# Patient Record
Sex: MALE | Race: WHITE | NOT HISPANIC OR LATINO | Employment: FULL TIME | URBAN - METROPOLITAN AREA
[De-identification: names, ages, dates, MRNs, and addresses within clinical notes are randomized per-mention and may not be internally consistent; named-entity substitution may affect disease eponyms.]

---

## 2024-09-26 ENCOUNTER — APPOINTMENT (OUTPATIENT)
Dept: PRIMARY CARE | Facility: CLINIC | Age: 49
End: 2024-09-26

## 2024-09-26 VITALS
DIASTOLIC BLOOD PRESSURE: 80 MMHG | SYSTOLIC BLOOD PRESSURE: 120 MMHG | TEMPERATURE: 97.1 F | WEIGHT: 164.2 LBS | BODY MASS INDEX: 28.03 KG/M2 | HEART RATE: 60 BPM | HEIGHT: 64 IN

## 2024-09-26 DIAGNOSIS — J06.9 UPPER RESPIRATORY TRACT INFECTION, UNSPECIFIED TYPE: ICD-10-CM

## 2024-09-26 DIAGNOSIS — R79.89 LOW TESTOSTERONE: ICD-10-CM

## 2024-09-26 DIAGNOSIS — Z00.00 WELLNESS EXAMINATION: Primary | ICD-10-CM

## 2024-09-26 DIAGNOSIS — R23.9 SKIN CHANGE: ICD-10-CM

## 2024-09-26 DIAGNOSIS — E55.9 VITAMIN D DEFICIENCY: ICD-10-CM

## 2024-09-26 DIAGNOSIS — Z13.6 SCREENING FOR CARDIOVASCULAR CONDITION: ICD-10-CM

## 2024-09-26 DIAGNOSIS — Z12.5 PROSTATE CANCER SCREENING: ICD-10-CM

## 2024-09-26 PROCEDURE — 90656 IIV3 VACC NO PRSV 0.5 ML IM: CPT | Performed by: INTERNAL MEDICINE

## 2024-09-26 PROCEDURE — 90471 IMMUNIZATION ADMIN: CPT | Performed by: INTERNAL MEDICINE

## 2024-09-26 PROCEDURE — 99396 PREV VISIT EST AGE 40-64: CPT | Performed by: INTERNAL MEDICINE

## 2024-09-26 PROCEDURE — 3008F BODY MASS INDEX DOCD: CPT | Performed by: INTERNAL MEDICINE

## 2024-09-26 RX ORDER — AZITHROMYCIN 250 MG/1
TABLET, FILM COATED ORAL
Qty: 6 TABLET | Refills: 0 | Status: SHIPPED | OUTPATIENT
Start: 2024-09-26 | End: 2024-10-01

## 2024-09-26 ASSESSMENT — ENCOUNTER SYMPTOMS
COUGH: 0
FEVER: 0
EYE PAIN: 0
LIGHT-HEADEDNESS: 0
ACTIVITY CHANGE: 0
APPETITE CHANGE: 0
FACIAL ASYMMETRY: 0
HEADACHES: 0
EYE DISCHARGE: 0
GASTROINTESTINAL NEGATIVE: 1
CHOKING: 0
UNEXPECTED WEIGHT CHANGE: 0
CHEST TIGHTNESS: 0
EYE ITCHING: 0
ENDOCRINE NEGATIVE: 1
PSYCHIATRIC NEGATIVE: 1
SHORTNESS OF BREATH: 0
DIAPHORESIS: 0
STRIDOR: 0
HEMATOLOGIC/LYMPHATIC NEGATIVE: 1
NUMBNESS: 0
ALLERGIC/IMMUNOLOGIC NEGATIVE: 1
FATIGUE: 0
WHEEZING: 0
DIZZINESS: 0
PHOTOPHOBIA: 0
PALPITATIONS: 0
APNEA: 0
ARTHRALGIAS: 0
EYE REDNESS: 0
CHILLS: 0

## 2024-09-26 NOTE — PROGRESS NOTES
Subjective   Patient ID: Ty Mercado is a 48 y.o. male who presents for Establish Care (RE ESTABLISH PHYSICAL) and URI (NEGATIVE COVID - HAS PRODUCTIVE COUGH).    URI   Pertinent negatives include no chest pain, coughing, headaches or wheezing.    URI SX NOTED   PAST MEDICAL HISTORY   Diagnosis Date   Gastric ulcer 2008     PAST SURGICAL HISTORY   Procedure Laterality Date   COLONOSCOPY - DIAGNOSTIC 2008 ,12 2023 NEG   EGD DIAGNOSTIC 2008 , 2023 NEG   REPAIR CRUCIATE LIGAMENT,KNEE Left 2013   VASECTOMY UNI/BI SPX W/POSTOP SEMEN EXAMS 2016     FAMILY HISTORY   Problem Relation Age of Onset   Colon Cancer No Family History   Colon Polyps No Family History   Gastric Cancer No Family History     Social History      2 KIDS 13 AND    NO Tobacco Use   Smoking status: Never   Smokeless tobacco: Never   Vaping Use   Vaping Use: Never used   Substance Use Topics   Alcohol use: NO  Comment: occassionally   Drug use: Never   CARDIO POS BIKE TREADMILL AND ACTIVE   WATER TRY   COFFEE VARIES 2- 3 CUPS  DIET IS GOOD   STRESS LEVEL LOW NOW      Current Facility-Administered Medications   Medication Dose Route Frequency   lidocaine (PF) 10 mg/mL (1 %) 1-2 mg injection (XYLOCAINE) 0.1-0.2 mL INTRADERMAL PRN   lactated ringers iv infusion 30 mL/hr INTRAVENOUS CONTINUOUS     ALLERGIES  No Known Allergies   Review of Systems   Constitutional:  Negative for activity change, appetite change, chills, diaphoresis, fatigue, fever and unexpected weight change.   HENT: Negative.     Eyes:  Negative for photophobia, pain, discharge, redness, itching and visual disturbance.   Respiratory:  Negative for apnea, cough, choking, chest tightness, shortness of breath, wheezing and stridor.    Cardiovascular:  Negative for chest pain, palpitations and leg swelling.   Gastrointestinal: Negative.    Endocrine: Negative.    Genitourinary: Negative.    Musculoskeletal:  Negative for arthralgias.   Skin: Negative.    Allergic/Immunologic:  "Negative.    Neurological:  Negative for dizziness, facial asymmetry, light-headedness, numbness and headaches.   Hematological: Negative.    Psychiatric/Behavioral: Negative.         Objective   /80 (BP Location: Left arm, Patient Position: Sitting, BP Cuff Size: Large adult)   Pulse 60   Temp 36.2 °C (97.1 °F) (Temporal)   Ht 1.626 m (5' 4\")   Wt 74.5 kg (164 lb 3.2 oz)   BMI 28.18 kg/m²     Physical Exam  Constitutional:       Appearance: Normal appearance.   HENT:      Head: Normocephalic and atraumatic.      Right Ear: Tympanic membrane normal.      Left Ear: Tympanic membrane normal.      Nose: Nose normal.   Eyes:      Extraocular Movements: Extraocular movements intact.      Conjunctiva/sclera: Conjunctivae normal.      Pupils: Pupils are equal, round, and reactive to light.   Cardiovascular:      Rate and Rhythm: Normal rate and regular rhythm.      Pulses: Normal pulses.      Heart sounds: Normal heart sounds.   Pulmonary:      Effort: Pulmonary effort is normal.      Breath sounds: Normal breath sounds.   Abdominal:      General: Abdomen is flat. Bowel sounds are normal.      Palpations: Abdomen is soft.   Genitourinary:     Prostate: Normal.   Musculoskeletal:         General: Normal range of motion.      Cervical back: Normal range of motion and neck supple.   Skin:     General: Skin is warm and dry.   Neurological:      General: No focal deficit present.      Mental Status: He is oriented to person, place, and time. Mental status is at baseline.   Psychiatric:         Mood and Affect: Mood normal.         Behavior: Behavior normal.         Thought Content: Thought content normal.         Judgment: Judgment normal.       Assessment/Plan   Diagnoses and all orders for this visit:  Wellness examination  -     CBC; Future  -     Lipid Panel; Future  -     Comprehensive Metabolic Panel; Future  -     TSH with reflex to Free T4 if abnormal; Future  -     Urinalysis with Reflex Culture and " Microscopic; Future  -     Hemoglobin A1C; Future  -     Vitamin B12; Future  Prostate cancer screening  -     Prostate Specific Antigen, Screen; Future  -     TSH with reflex to Free T4 if abnormal; Future  -     Urinalysis with Reflex Culture and Microscopic; Future  -     Hemoglobin A1C; Future  -     Vitamin B12; Future  Vitamin D deficiency  -     TSH with reflex to Free T4 if abnormal; Future  -     Urinalysis with Reflex Culture and Microscopic; Future  -     Hemoglobin A1C; Future  -     Vitamin B12; Future  -     Vitamin D 25-Hydroxy,Total (for eval of Vitamin D levels); Future  Low testosterone  -     TSH with reflex to Free T4 if abnormal; Future  -     Urinalysis with Reflex Culture and Microscopic; Future  -     Hemoglobin A1C; Future  -     Vitamin B12; Future  -     Testosterone; Future  Screening for cardiovascular condition  -     CT cardiac scoring wo IV contrast; Future  Other orders  -     Flu vaccine, trivalent, preservative free, age 6 months and greater (Fluarix/Fluzone/Flulaval)

## 2024-10-02 ENCOUNTER — LAB (OUTPATIENT)
Dept: LAB | Facility: LAB | Age: 49
End: 2024-10-02
Payer: COMMERCIAL

## 2024-10-02 DIAGNOSIS — Z00.00 WELLNESS EXAMINATION: ICD-10-CM

## 2024-10-02 DIAGNOSIS — E55.9 VITAMIN D DEFICIENCY: ICD-10-CM

## 2024-10-02 DIAGNOSIS — Z12.5 PROSTATE CANCER SCREENING: ICD-10-CM

## 2024-10-02 DIAGNOSIS — R79.89 LOW TESTOSTERONE: ICD-10-CM

## 2024-10-02 LAB
25(OH)D3 SERPL-MCNC: 46 NG/ML (ref 30–100)
ALBUMIN SERPL BCP-MCNC: 4.6 G/DL (ref 3.4–5)
ALP SERPL-CCNC: 53 U/L (ref 33–120)
ALT SERPL W P-5'-P-CCNC: 53 U/L (ref 10–52)
ANION GAP SERPL CALC-SCNC: 13 MMOL/L (ref 10–20)
AST SERPL W P-5'-P-CCNC: 37 U/L (ref 9–39)
BILIRUB SERPL-MCNC: 0.7 MG/DL (ref 0–1.2)
BUN SERPL-MCNC: 17 MG/DL (ref 6–23)
CALCIUM SERPL-MCNC: 10.3 MG/DL (ref 8.6–10.6)
CHLORIDE SERPL-SCNC: 103 MMOL/L (ref 98–107)
CHOLEST SERPL-MCNC: 269 MG/DL (ref 0–199)
CHOLESTEROL/HDL RATIO: 5
CO2 SERPL-SCNC: 29 MMOL/L (ref 21–32)
CREAT SERPL-MCNC: 0.89 MG/DL (ref 0.5–1.3)
EGFRCR SERPLBLD CKD-EPI 2021: >90 ML/MIN/1.73M*2
GLUCOSE SERPL-MCNC: 89 MG/DL (ref 74–99)
HDLC SERPL-MCNC: 53.5 MG/DL
LDLC SERPL CALC-MCNC: 174 MG/DL
NON HDL CHOLESTEROL: 216 MG/DL (ref 0–149)
POTASSIUM SERPL-SCNC: 4.4 MMOL/L (ref 3.5–5.3)
PROT SERPL-MCNC: 7.7 G/DL (ref 6.4–8.2)
PSA SERPL-MCNC: 0.46 NG/ML
SODIUM SERPL-SCNC: 141 MMOL/L (ref 136–145)
TESTOST SERPL-MCNC: 157 NG/DL (ref 240–1000)
TRIGL SERPL-MCNC: 206 MG/DL (ref 0–149)
TSH SERPL-ACNC: 0.66 MIU/L (ref 0.44–3.98)
VIT B12 SERPL-MCNC: 667 PG/ML (ref 211–911)
VLDL: 41 MG/DL (ref 0–40)

## 2024-10-02 PROCEDURE — 85027 COMPLETE CBC AUTOMATED: CPT

## 2024-10-02 PROCEDURE — 83036 HEMOGLOBIN GLYCOSYLATED A1C: CPT

## 2024-10-02 PROCEDURE — 80061 LIPID PANEL: CPT

## 2024-10-02 PROCEDURE — G0103 PSA SCREENING: HCPCS

## 2024-10-02 PROCEDURE — 82607 VITAMIN B-12: CPT

## 2024-10-02 PROCEDURE — 80053 COMPREHEN METABOLIC PANEL: CPT

## 2024-10-02 PROCEDURE — 84443 ASSAY THYROID STIM HORMONE: CPT

## 2024-10-02 PROCEDURE — 82306 VITAMIN D 25 HYDROXY: CPT

## 2024-10-02 PROCEDURE — 84403 ASSAY OF TOTAL TESTOSTERONE: CPT

## 2024-10-02 PROCEDURE — 36415 COLL VENOUS BLD VENIPUNCTURE: CPT

## 2024-10-03 LAB
ERYTHROCYTE [DISTWIDTH] IN BLOOD BY AUTOMATED COUNT: 12.7 % (ref 11.5–14.5)
EST. AVERAGE GLUCOSE BLD GHB EST-MCNC: 105 MG/DL
HBA1C MFR BLD: 5.3 %
HCT VFR BLD AUTO: 45.8 % (ref 41–52)
HGB BLD-MCNC: 14.8 G/DL (ref 13.5–17.5)
MCH RBC QN AUTO: 28.7 PG (ref 26–34)
MCHC RBC AUTO-ENTMCNC: 32.3 G/DL (ref 32–36)
MCV RBC AUTO: 89 FL (ref 80–100)
NRBC BLD-RTO: 0 /100 WBCS (ref 0–0)
PLATELET # BLD AUTO: 359 X10*3/UL (ref 150–450)
RBC # BLD AUTO: 5.15 X10*6/UL (ref 4.5–5.9)
WBC # BLD AUTO: 8.1 X10*3/UL (ref 4.4–11.3)

## 2024-10-05 ENCOUNTER — LAB (OUTPATIENT)
Dept: LAB | Facility: LAB | Age: 49
End: 2024-10-05
Payer: COMMERCIAL

## 2024-10-05 PROCEDURE — 81003 URINALYSIS AUTO W/O SCOPE: CPT

## 2024-10-06 LAB
APPEARANCE UR: ABNORMAL
BILIRUB UR STRIP.AUTO-MCNC: NEGATIVE MG/DL
COLOR UR: YELLOW
GLUCOSE UR STRIP.AUTO-MCNC: NORMAL MG/DL
HOLD SPECIMEN: NORMAL
KETONES UR STRIP.AUTO-MCNC: NEGATIVE MG/DL
LEUKOCYTE ESTERASE UR QL STRIP.AUTO: NEGATIVE
NITRITE UR QL STRIP.AUTO: NEGATIVE
PH UR STRIP.AUTO: 5.5 [PH]
PROT UR STRIP.AUTO-MCNC: NEGATIVE MG/DL
RBC # UR STRIP.AUTO: NEGATIVE /UL
SP GR UR STRIP.AUTO: 1.02
UROBILINOGEN UR STRIP.AUTO-MCNC: NORMAL MG/DL

## 2024-10-08 ENCOUNTER — TELEPHONE (OUTPATIENT)
Dept: PRIMARY CARE | Facility: CLINIC | Age: 49
End: 2024-10-08
Payer: COMMERCIAL

## 2024-10-08 DIAGNOSIS — R74.8 ELEVATED LIVER ENZYMES: ICD-10-CM

## 2024-10-08 DIAGNOSIS — Z11.3 SCREEN FOR STD (SEXUALLY TRANSMITTED DISEASE): ICD-10-CM

## 2024-10-08 DIAGNOSIS — D64.9 ANEMIA, UNSPECIFIED TYPE: ICD-10-CM

## 2024-10-08 DIAGNOSIS — Z11.59 SCREENING FOR VIRAL DISEASE: Primary | ICD-10-CM

## 2024-10-08 DIAGNOSIS — R74.8 INCREASED LIVER ENZYMES: ICD-10-CM

## 2024-10-08 NOTE — TELEPHONE ENCOUNTER
----- Message from Yaron Benz sent at 10/8/2024  8:25 AM EDT -----  Labs are normal very high cholesterol , low testosterone, elevated liver enzymes  So  Ultrasound liver and hepatitis profile  Low fat diet   T treatment is up to Ty

## 2024-10-09 ENCOUNTER — LAB (OUTPATIENT)
Dept: LAB | Facility: LAB | Age: 49
End: 2024-10-09
Payer: COMMERCIAL

## 2024-10-09 DIAGNOSIS — Z11.3 SCREEN FOR STD (SEXUALLY TRANSMITTED DISEASE): ICD-10-CM

## 2024-10-09 DIAGNOSIS — D64.9 ANEMIA, UNSPECIFIED TYPE: ICD-10-CM

## 2024-10-09 DIAGNOSIS — Z11.59 SCREENING FOR VIRAL DISEASE: ICD-10-CM

## 2024-10-09 PROCEDURE — 87340 HEPATITIS B SURFACE AG IA: CPT

## 2024-10-09 PROCEDURE — 86706 HEP B SURFACE ANTIBODY: CPT

## 2024-10-09 PROCEDURE — 86803 HEPATITIS C AB TEST: CPT

## 2024-10-09 PROCEDURE — 82728 ASSAY OF FERRITIN: CPT

## 2024-10-09 PROCEDURE — 36415 COLL VENOUS BLD VENIPUNCTURE: CPT

## 2024-10-10 ENCOUNTER — HOSPITAL ENCOUNTER (OUTPATIENT)
Dept: RADIOLOGY | Facility: CLINIC | Age: 49
End: 2024-10-10
Payer: COMMERCIAL

## 2024-10-10 DIAGNOSIS — Z11.3 SCREEN FOR STD (SEXUALLY TRANSMITTED DISEASE): Primary | ICD-10-CM

## 2024-10-10 LAB
FERRITIN SERPL-MCNC: 232 NG/ML (ref 20–300)
HBV SURFACE AB SER-ACNC: 55.1 MIU/ML
HBV SURFACE AG SERPL QL IA: NONREACTIVE
HCV AB SER QL: NONREACTIVE

## 2024-10-14 ENCOUNTER — HOSPITAL ENCOUNTER (OUTPATIENT)
Dept: RADIOLOGY | Facility: CLINIC | Age: 49
Discharge: HOME | End: 2024-10-14
Payer: COMMERCIAL

## 2024-10-14 DIAGNOSIS — R74.8 INCREASED LIVER ENZYMES: ICD-10-CM

## 2024-10-14 DIAGNOSIS — Z11.59 SCREENING FOR VIRAL DISEASE: ICD-10-CM

## 2024-10-14 DIAGNOSIS — R74.8 ELEVATED LIVER ENZYMES: ICD-10-CM

## 2024-10-14 PROCEDURE — 76705 ECHO EXAM OF ABDOMEN: CPT | Performed by: RADIOLOGY

## 2024-10-14 PROCEDURE — 76705 ECHO EXAM OF ABDOMEN: CPT

## 2024-10-16 DIAGNOSIS — Z00.00 WELLNESS EXAMINATION: Primary | ICD-10-CM

## 2024-10-16 DIAGNOSIS — E78.2 COMBINED HYPERLIPIDEMIA: ICD-10-CM

## 2025-02-03 ENCOUNTER — APPOINTMENT (OUTPATIENT)
Dept: DERMATOLOGY | Facility: CLINIC | Age: 50
End: 2025-02-03
Payer: COMMERCIAL

## 2025-02-05 LAB
ALBUMIN SERPL-MCNC: 4.7 G/DL (ref 3.6–5.1)
ALBUMIN/GLOB SERPL: 1.6 (CALC) (ref 1–2.5)
ALP SERPL-CCNC: 54 U/L (ref 35–144)
ALT SERPL-CCNC: 34 U/L (ref 9–46)
AST SERPL-CCNC: 27 U/L (ref 10–35)
BILIRUB DIRECT SERPL-MCNC: 0.1 MG/DL
BILIRUB INDIRECT SERPL-MCNC: 0.5 MG/DL (CALC) (ref 0.2–1.2)
BILIRUB SERPL-MCNC: 0.6 MG/DL (ref 0.2–1.2)
CHOLEST SERPL-MCNC: 235 MG/DL
CHOLEST/HDLC SERPL: 3.9 (CALC)
GLOBULIN SER CALC-MCNC: 2.9 G/DL (CALC) (ref 1.9–3.7)
HDLC SERPL-MCNC: 60 MG/DL
LDLC SERPL CALC-MCNC: 148 MG/DL (CALC)
NONHDLC SERPL-MCNC: 175 MG/DL (CALC)
PROT SERPL-MCNC: 7.6 G/DL (ref 6.1–8.1)
TRIGL SERPL-MCNC: 138 MG/DL

## 2025-02-15 ENCOUNTER — OFFICE VISIT (OUTPATIENT)
Dept: URGENT CARE | Age: 50
End: 2025-02-15
Payer: COMMERCIAL

## 2025-02-15 VITALS
DIASTOLIC BLOOD PRESSURE: 73 MMHG | SYSTOLIC BLOOD PRESSURE: 106 MMHG | TEMPERATURE: 98.1 F | OXYGEN SATURATION: 98 % | HEART RATE: 73 BPM | RESPIRATION RATE: 18 BRPM

## 2025-02-15 DIAGNOSIS — J01.40 ACUTE PANSINUSITIS, RECURRENCE NOT SPECIFIED: Primary | ICD-10-CM

## 2025-02-15 RX ORDER — AMOXICILLIN AND CLAVULANATE POTASSIUM 875; 125 MG/1; MG/1
875 TABLET, FILM COATED ORAL 2 TIMES DAILY
Qty: 14 TABLET | Refills: 0 | Status: SHIPPED | OUTPATIENT
Start: 2025-02-15 | End: 2025-02-22

## 2025-02-15 ASSESSMENT — ENCOUNTER SYMPTOMS
DEPRESSION: 0
OCCASIONAL FEELINGS OF UNSTEADINESS: 0
LOSS OF SENSATION IN FEET: 0

## 2025-02-15 ASSESSMENT — PATIENT HEALTH QUESTIONNAIRE - PHQ9
2. FEELING DOWN, DEPRESSED OR HOPELESS: NOT AT ALL
1. LITTLE INTEREST OR PLEASURE IN DOING THINGS: NOT AT ALL
SUM OF ALL RESPONSES TO PHQ9 QUESTIONS 1 AND 2: 0

## 2025-02-15 NOTE — PROGRESS NOTES
Subjective   Patient ID: Ty Mercado is a 49 y.o. male. They present today with a chief complaint of Nasal Congestion (@ weeks nasal and head congestion, ear pressure ).    History of Present Illness  See mdm          Past Medical History  Allergies as of 02/15/2025    (No Known Allergies)       (Not in a hospital admission)       No past medical history on file.    No past surgical history on file.     reports that he has never smoked. He has never used smokeless tobacco.    Review of Systems  Review of Systems   All other systems reviewed and are negative.                                 Objective    Vitals:    02/15/25 1351   BP: 106/73   BP Location: Left arm   Patient Position: Sitting   BP Cuff Size: Adult   Pulse: 73   Resp: 18   Temp: 36.7 °C (98.1 °F)   TempSrc: Oral   SpO2: 98%     No LMP for male patient.    Physical Exam  Vitals and nursing note reviewed.   Constitutional:       General: He is not in acute distress.     Appearance: He is normal weight. He is not ill-appearing, toxic-appearing or diaphoretic.   HENT:      Head: Normocephalic and atraumatic.      Right Ear: Tympanic membrane, ear canal and external ear normal.      Left Ear: Tympanic membrane, ear canal and external ear normal.      Nose: Congestion present.      Comments: Generalized sinus tenderness     Mouth/Throat:      Mouth: Mucous membranes are moist.      Pharynx: No oropharyngeal exudate or posterior oropharyngeal erythema.   Eyes:      General: No scleral icterus.        Right eye: No discharge.         Left eye: No discharge.      Extraocular Movements: Extraocular movements intact.      Conjunctiva/sclera: Conjunctivae normal.      Pupils: Pupils are equal, round, and reactive to light.   Cardiovascular:      Rate and Rhythm: Normal rate and regular rhythm.      Pulses: Normal pulses.      Heart sounds: No murmur heard.     No friction rub. No gallop.   Pulmonary:      Effort: Pulmonary effort is normal. No respiratory  distress.      Breath sounds: No stridor. No wheezing, rhonchi or rales.   Abdominal:      General: Abdomen is flat.   Musculoskeletal:      Cervical back: Normal range of motion and neck supple. No rigidity or tenderness.   Skin:     General: Skin is warm and dry.      Capillary Refill: Capillary refill takes less than 2 seconds.   Neurological:      General: No focal deficit present.      Mental Status: He is alert.   Psychiatric:         Mood and Affect: Mood normal.         Behavior: Behavior normal.         Procedures    Point of Care Test & Imaging Results from this visit  No results found for this visit on 02/15/25.   No results found.    Diagnostic study results (if any) were reviewed by Becky Varghese PA-C.    Assessment/Plan   Allergies, medications, history, and pertinent labs/EKGs/Imaging reviewed by Becky Varghese PA-C.     Medical Decision Making  49-year-old male otherwise healthy presents with 2 weeks of sinus congestion, facial pressure and pain.  Reports symptoms worsening over the past 2 weeks.  He has no drug allergies and no chronic health conditions.  Exam benign.  Suspect bacterial sinusitis given symptoms duration and worsening.  He is taking over-the-counter sinus medications as well as Zyrtec and Flonase, encouraged to continue with.  No features mastoiditis, AOM, TM perforation, reactive airway, pneumonia, sepsis or other emergency.  Encouraged follow-up with primary care provider.  Discussed expected course, indications for return or for presentation to emergency department.  Discharged good condition agreeable to plan as discussed.      Orders and Diagnoses  Diagnoses and all orders for this visit:  Acute pansinusitis, recurrence not specified  -     amoxicillin-pot clavulanate (Augmentin) 875-125 mg tablet; Take 1 tablet (875 mg) by mouth 2 times a day for 7 days.      Medical Admin Record      Patient disposition: Home    Electronically signed by Becky Varghese PA-C  3:32  PM

## 2025-02-26 ENCOUNTER — APPOINTMENT (OUTPATIENT)
Dept: DERMATOLOGY | Facility: CLINIC | Age: 50
End: 2025-02-26
Payer: COMMERCIAL

## 2025-02-26 DIAGNOSIS — L81.4 LENTIGO: ICD-10-CM

## 2025-02-26 DIAGNOSIS — D18.01 HEMANGIOMA OF SKIN: ICD-10-CM

## 2025-02-26 DIAGNOSIS — L82.1 SEBORRHEIC KERATOSIS: ICD-10-CM

## 2025-02-26 DIAGNOSIS — L85.3 XEROSIS CUTIS: ICD-10-CM

## 2025-02-26 DIAGNOSIS — D22.9 MULTIPLE BENIGN NEVI: Primary | ICD-10-CM

## 2025-02-26 PROCEDURE — 99203 OFFICE O/P NEW LOW 30 MIN: CPT | Performed by: DERMATOLOGY

## 2025-02-26 PROCEDURE — 1036F TOBACCO NON-USER: CPT | Performed by: DERMATOLOGY

## 2025-02-26 RX ORDER — BISMUTH SUBSALICYLATE 262 MG
1 TABLET,CHEWABLE ORAL DAILY
COMMUNITY

## 2025-02-26 NOTE — PROGRESS NOTES
Subjective     Ty Mercado is a 49 y.o. male who presents for the following: Skin Check (Pt denies skin cancer history. Pt declines chaperone. ).     Review of Systems:  No other skin or systemic complaints other than what is documented elsewhere in the note.    The following portions of the chart were reviewed this encounter and updated as appropriate:  Tobacco  Allergies  Meds  Problems  Med Hx  Surg Hx  Fam Hx                Objective     Well appearing patient in no apparent distress; mood and affect are within normal limits.    A full examination was performed including scalp, face, neck, chest, abdomen, back, bilateral upper extremities, bilateral lower extremities. Patient declines exam underneath the underwear; patient declines exam of the lower abdomen/mons pubis, buttocks, groin, genitalia, perineal and perianal skin and these areas were not examined.    All findings within normal limits unless otherwise noted below.    Assessment/Plan   1. Multiple benign nevi  Brown and tan macules and papules with reassuring findings on dermoscopy    -These lesions have benign, reassuring patterns on dermoscopy  -Recommend continued self observation, and to contact the office if any changes in nevi are noticed    2. Lentigo  Tan macules    -Benign appearing on exam  -Reassurance, recommend observation    3. Seborrheic keratosis  Stuck on, waxy macule(s)/papule(s)/plaque(s) with comedo-like openings and milia like cysts    -Discussed the nature of the diagnosis  -Reassurance, recommend continued observation    4. Hemangioma of skin  Cherry red papules    -Discussed the nature of the diagnosis  -Reassurance, recommend continued observation    5. Xerosis cutis  Dry skin    -Discussed nature of condition  -Discussed gentle skin care habits including using gentle soap such as Dove or Cetaphil to cleanse the skin.   -Recommend to avoid harsh cleansers/soaps such as: Dial, Lever 2000, Jeni Spring.  -Recommend to  use emollients twice daily, once immediately after the shower while the skin is still damp.   -Recommended emollients include: Aveeno Eczema Therapy or Daily Moisturizer, La Roche Posay Lipikar AP Balm, or plain Vaseline.   -Recommend to avoid hot water/showers; lukewarm or cool water/showers will be beneficial.             Discussed/information given on safe sun practices and use of sunscreen, sun protective clothing or sun avoidance. Recommend to use over the counter medication of sunscreen with a SPF 30 or higher on a daily basis prior to sun exposure to reduce the risk of skin cancer.    Follow up in 2 years for FSE  Discussed if there are any changes or development of concerning symptoms (lesion/skin condition is changing, bleeding, enlarging, or worsening) the patient is to contact my office. The patient verbalizes understanding.     Pauline Gold MD  2/26/2025

## 2025-03-04 ENCOUNTER — APPOINTMENT (OUTPATIENT)
Dept: RADIOLOGY | Facility: CLINIC | Age: 50
End: 2025-03-04
Payer: COMMERCIAL

## 2025-05-08 ENCOUNTER — APPOINTMENT (OUTPATIENT)
Dept: RADIOLOGY | Facility: CLINIC | Age: 50
End: 2025-05-08
Payer: COMMERCIAL

## 2025-07-23 ENCOUNTER — APPOINTMENT (OUTPATIENT)
Dept: RADIOLOGY | Facility: CLINIC | Age: 50
End: 2025-07-23
Payer: COMMERCIAL

## 2025-09-30 ENCOUNTER — APPOINTMENT (OUTPATIENT)
Dept: PRIMARY CARE | Facility: CLINIC | Age: 50
End: 2025-09-30
Payer: COMMERCIAL